# Patient Record
Sex: FEMALE | Race: WHITE | NOT HISPANIC OR LATINO | Employment: UNEMPLOYED | URBAN - METROPOLITAN AREA
[De-identification: names, ages, dates, MRNs, and addresses within clinical notes are randomized per-mention and may not be internally consistent; named-entity substitution may affect disease eponyms.]

---

## 2022-11-11 ENCOUNTER — HOSPITAL ENCOUNTER (EMERGENCY)
Facility: HOSPITAL | Age: 62
Discharge: HOME | End: 2022-11-11
Attending: EMERGENCY MEDICINE
Payer: MEDICARE

## 2022-11-11 VITALS
HEIGHT: 61 IN | WEIGHT: 140 LBS | TEMPERATURE: 98 F | HEART RATE: 97 BPM | BODY MASS INDEX: 26.43 KG/M2 | OXYGEN SATURATION: 99 % | DIASTOLIC BLOOD PRESSURE: 82 MMHG | SYSTOLIC BLOOD PRESSURE: 153 MMHG | RESPIRATION RATE: 20 BRPM

## 2022-11-11 DIAGNOSIS — S69.92XA LEFT WRIST INJURY, INITIAL ENCOUNTER: ICD-10-CM

## 2022-11-11 DIAGNOSIS — S41.112A SKIN TEAR OF LEFT UPPER EXTREMITY: Primary | ICD-10-CM

## 2022-11-11 PROCEDURE — 90714 TD VACC NO PRESV 7 YRS+ IM: CPT | Performed by: EMERGENCY MEDICINE

## 2022-11-11 PROCEDURE — 73110 X-RAY EXAM OF WRIST: CPT | Mod: TC,LT

## 2022-11-11 PROCEDURE — 99284 EMERGENCY DEPT VISIT MOD MDM: CPT

## 2022-11-11 PROCEDURE — 90471 IMMUNIZATION ADMIN: CPT | Performed by: EMERGENCY MEDICINE

## 2022-11-11 PROCEDURE — 25000003 PHARM REV CODE 250: Performed by: EMERGENCY MEDICINE

## 2022-11-11 PROCEDURE — 63600175 PHARM REV CODE 636 W HCPCS: Performed by: EMERGENCY MEDICINE

## 2022-11-11 PROCEDURE — 73110 X-RAY EXAM OF WRIST: CPT | Mod: 26,LT,, | Performed by: RADIOLOGY

## 2022-11-11 PROCEDURE — 73110 XR WRIST COMPLETE 3 VIEWS LEFT: ICD-10-PCS | Mod: 26,LT,, | Performed by: RADIOLOGY

## 2022-11-11 RX ORDER — CEPHALEXIN 250 MG/1
500 CAPSULE ORAL
Status: COMPLETED | OUTPATIENT
Start: 2022-11-11 | End: 2022-11-11

## 2022-11-11 RX ORDER — CEPHALEXIN 250 MG/1
250 CAPSULE ORAL 4 TIMES DAILY
Qty: 28 CAPSULE | Refills: 0 | Status: SHIPPED | OUTPATIENT
Start: 2022-11-11 | End: 2022-11-18

## 2022-11-11 RX ADMIN — TETANUS AND DIPHTHERIA TOXOIDS ADSORBED 0.5 ML: 2; 2 INJECTION INTRAMUSCULAR at 09:11

## 2022-11-11 RX ADMIN — BACITRACIN ZINC, NEOMYCIN, POLYMYXIN B 1 EACH: 400; 3.5; 5 OINTMENT TOPICAL at 10:11

## 2022-11-11 RX ADMIN — CEPHALEXIN 500 MG: 250 CAPSULE ORAL at 10:11

## 2022-11-12 NOTE — DISCHARGE INSTRUCTIONS
Wash the area daily with gentle soap and water, then apply triple antibiotic and a sterile bandage.  Take Keflex for possible infection.  Follow-up with your doctor on Monday and return here as needed or if worse in any way.

## 2022-11-12 NOTE — ED PROVIDER NOTES
Encounter Date: 11/11/2022       History     Chief Complaint   Patient presents with    left wrist pain     Dog jumped down from couch and landed on patient's left wrist. No active bleeding at this time     62-year-old female here from home for evaluation and treatment of left wrist injury.  Patient states that yesterday afternoon her small dog jumped upon the couch where she was sitting, and landed on her left wrist.  She sustained a skin tear on the dorsum of the left wrist.  Today the wrist is tender to palpation, and there is some erythema surrounding the skin tear.  Patient has normal range of motion of the wrist and fingers.  Normal sensation.  She denies fevers or chills.  She has not put any medications or treatments on the wound.    Review of patient's allergies indicates:   Allergen Reactions    Prednisone     Topamax [topiramate]      Past Medical History:   Diagnosis Date    Cancer     Coronary artery disease     GERD (gastroesophageal reflux disease)     Hypertension     Migraine headache     Stroke      Past Surgical History:   Procedure Laterality Date    APPENDECTOMY      CHOLECYSTECTOMY      COLON SURGERY       History reviewed. No pertinent family history.  Social History     Tobacco Use    Smoking status: Every Day     Packs/day: 0.50     Types: Cigarettes    Smokeless tobacco: Never   Substance Use Topics    Alcohol use: Never    Drug use: Never     Review of Systems   Constitutional: Negative.    HENT: Negative.     Eyes: Negative.    Respiratory: Negative.     Cardiovascular: Negative.    Gastrointestinal: Negative.    Endocrine: Negative.    Genitourinary: Negative.    Musculoskeletal: Negative.    Skin:  Positive for wound.   Allergic/Immunologic: Negative.    Neurological: Negative.    Hematological: Negative.    Psychiatric/Behavioral: Negative.       Physical Exam     Initial Vitals [11/11/22 1827]   BP Pulse Resp Temp SpO2   (!) 153/82 97 20 98 °F (36.7 °C) 99 %      MAP       --          Physical Exam    Nursing note and vitals reviewed.  Constitutional: She appears well-developed and well-nourished. She is not diaphoretic. No distress.   HENT:   Head: Normocephalic and atraumatic.   Nose: Nose normal.   Mouth/Throat: Oropharynx is clear and moist. No oropharyngeal exudate.   Eyes: Conjunctivae and EOM are normal. Pupils are equal, round, and reactive to light. No scleral icterus.   Neck: Neck supple. No JVD present.   Normal range of motion.  Cardiovascular:  Normal rate, regular rhythm, normal heart sounds and intact distal pulses.           No murmur heard.  Pulmonary/Chest: Breath sounds normal. No stridor. No respiratory distress. She has no wheezes.   Abdominal: Abdomen is soft. Bowel sounds are normal. She exhibits no distension. There is no abdominal tenderness.   Musculoskeletal:         General: No tenderness or edema. Normal range of motion.      Cervical back: Normal range of motion and neck supple.      Comments: On the dorsum of the left wrist, there is a small skin tear measuring approximately 2.5 cm in length and 1 cm in width.  There is erythema surrounding the wound, encompassing an area of approximately 4 cm x 2 cm.  No appreciable edema.  Range of motion of the wrist is good.  Distal capillary refill, sensory function and motor function are normal.     Neurological: She is alert and oriented to person, place, and time. She has normal strength and normal reflexes. No cranial nerve deficit or sensory deficit. GCS score is 15. GCS eye subscore is 4. GCS verbal subscore is 5. GCS motor subscore is 6.   Skin: Skin is dry. Capillary refill takes less than 2 seconds. No rash noted. No erythema.   Psychiatric: She has a normal mood and affect. Her behavior is normal.       ED Course   Procedures  Labs Reviewed - No data to display       Imaging Results              X-Ray Wrist Complete Left (Final result)  Result time 11/12/22 09:08:31      Final result by Charbel Navarro MD  (11/12/22 09:08:31)                   Narrative:    EXAMINATION:  XR WRIST COMPLETE 3 VIEWS LEFT    CLINICAL HISTORY:  Unspecified injury of left wrist, hand and finger(s), initial encounter    TECHNIQUE:  PA, lateral, and oblique views of the left wrist were performed.    COMPARISON:  None    FINDINGS:  No acute fracture or malalignment.  Preserved joint spaces.  Mild soft tissue swelling of the dorsal wrist.      Electronically signed by: Charbel Navarro  Date:    11/12/2022  Time:    09:08                                  X-Rays:   Independently Interpreted Readings:   Other Readings:  X-ray left wrist, personally reviewed by me shows no evidence of fracture or dislocation.  No foreign body.  Joint spaces well maintained.    Medications   diptheria-tetanus toxoids 2-2 Lf unit/0.5 mL injection 0.5 mL (0.5 mLs Intramuscular Given 11/11/22 2119)   cephALEXin capsule 500 mg (500 mg Oral Given 11/11/22 2215)   neomycin-bacitracnZn-polymyxnB packet (1 each Topical (Top) Given 11/11/22 2214)     Medical Decision Making:   Differential Diagnosis:   Skin tear, fracture, dislocation, contusion, infection, cellulitis, etc.  X-ray shows no evidence of fracture or dislocation.  No evidence of foreign body.  There is some cellulitis surrounding the wound, indicating possible early cellulitis.  Patient has been started on Keflex and will continue this at home.  She will follow-up with PCP within the next few days and return here as needed or if worse in any way.                        Clinical Impression:   Final diagnoses:  [S69.92XA] Left wrist injury, initial encounter  [S41.112A] Skin tear of left upper extremity (Primary)        ED Disposition Condition    Discharge Stable          ED Prescriptions       Medication Sig Dispense Start Date End Date Auth. Provider    cephALEXin (KEFLEX) 250 MG capsule Take 1 capsule (250 mg total) by mouth 4 (four) times daily. for 7 days 28 capsule 11/11/2022 11/18/2022 Mynor Rosales MD           Follow-up Information       Follow up With Specialties Details Why Contact Info    your doctor  In 3 days      [facility] Emergency Medicine  As needed, If symptoms worsen [address] [facility] [address] [phone]             Mynor Rosales MD  11/17/22 0678